# Patient Record
Sex: FEMALE | URBAN - METROPOLITAN AREA
[De-identification: names, ages, dates, MRNs, and addresses within clinical notes are randomized per-mention and may not be internally consistent; named-entity substitution may affect disease eponyms.]

---

## 2020-06-19 ENCOUNTER — OFFICE VISIT (OUTPATIENT)
Dept: URBAN - METROPOLITAN AREA CLINIC 58 | Facility: CLINIC | Age: 60
End: 2020-06-19
Payer: COMMERCIAL

## 2020-06-19 DIAGNOSIS — H25.13 AGE-RELATED NUCLEAR CATARACT, BILATERAL: ICD-10-CM

## 2020-06-19 DIAGNOSIS — H52.13 MYOPIA, BILATERAL: ICD-10-CM

## 2020-06-19 DIAGNOSIS — H43.312 VITREOUS MEMBRANES AND STRANDS, LEFT EYE: Primary | ICD-10-CM

## 2020-06-19 PROCEDURE — 92004 COMPRE OPH EXAM NEW PT 1/>: CPT | Performed by: OPTOMETRIST

## 2020-06-19 PROCEDURE — 92310 CONTACT LENS FITTING OU: CPT | Performed by: OPTOMETRIST

## 2020-06-19 ASSESSMENT — INTRAOCULAR PRESSURE
OD: 16
OS: 14

## 2020-06-19 ASSESSMENT — KERATOMETRY
OS: 47.50
OD: 47.23

## 2020-06-19 ASSESSMENT — VISUAL ACUITY
OD: 20/20
OS: 20/20

## 2020-06-19 NOTE — IMPRESSION/PLAN
Impression: Age-related nuclear cataract, bilateral: H25.13. Plan: Discussed diagnosis in detail with patient. No treatment is required at this time. Will continue to observe condition and or symptoms. Call if [address] worsens.

## 2020-06-19 NOTE — IMPRESSION/PLAN
Impression: Vitreous membranes and strands, left eye: H43.312. Plan: Discussed diagnosis with Pt. There is no evidence of Retinal Pathology. Discussed S/S of RD. Pt to RTC if they occur.

## 2020-06-19 NOTE — IMPRESSION/PLAN
Impression: Myopia, bilateral: H52.13. Plan: Discussed diagnosis in detail with patient. No treatment is required at this time. Will continue to observe condition and or symptoms. Call if [place] worsens.

## 2020-06-26 ENCOUNTER — TESTING ONLY (OUTPATIENT)
Dept: URBAN - METROPOLITAN AREA CLINIC 58 | Facility: CLINIC | Age: 60
End: 2020-06-26

## 2020-06-26 PROCEDURE — 92310 CONTACT LENS FITTING OU: CPT | Performed by: OPTOMETRIST

## 2020-06-26 NOTE — IMPRESSION/PLAN
Impression: Myopia, bilateral: H52.13. Plan: PT happy with contacts, good movement, health, comfort vision. Cl rx released. PT wants OS corrected for distance.   Seems much better in office when tried